# Patient Record
Sex: FEMALE
[De-identification: names, ages, dates, MRNs, and addresses within clinical notes are randomized per-mention and may not be internally consistent; named-entity substitution may affect disease eponyms.]

---

## 2019-11-12 NOTE — NUR
*-* CASE MANAGEMENT NOTE *-*



EMAIL SENT MY [facility] IN ADMITTING: MAKE SURE WE UTILIZE LETTER ATTAHCED IN EMAIL 
FOR DISCAHRGE PLANNING AND DME. EMAIL SENT ON 11/12/19

## 2019-11-15 ENCOUNTER — HOSPITAL ENCOUNTER (INPATIENT)
Dept: HOSPITAL 72 - SDSOVERFLO | Age: 49
LOS: 4 days | Discharge: HOME | DRG: 473 | End: 2019-11-19
Payer: COMMERCIAL

## 2019-11-15 VITALS — DIASTOLIC BLOOD PRESSURE: 55 MMHG | SYSTOLIC BLOOD PRESSURE: 119 MMHG

## 2019-11-15 VITALS — HEIGHT: 61 IN | WEIGHT: 170 LBS | BODY MASS INDEX: 32.1 KG/M2

## 2019-11-15 VITALS — SYSTOLIC BLOOD PRESSURE: 110 MMHG | DIASTOLIC BLOOD PRESSURE: 69 MMHG

## 2019-11-15 VITALS — DIASTOLIC BLOOD PRESSURE: 66 MMHG | SYSTOLIC BLOOD PRESSURE: 141 MMHG

## 2019-11-15 VITALS — SYSTOLIC BLOOD PRESSURE: 144 MMHG | DIASTOLIC BLOOD PRESSURE: 67 MMHG

## 2019-11-15 VITALS — DIASTOLIC BLOOD PRESSURE: 74 MMHG | SYSTOLIC BLOOD PRESSURE: 167 MMHG

## 2019-11-15 VITALS — SYSTOLIC BLOOD PRESSURE: 129 MMHG | DIASTOLIC BLOOD PRESSURE: 67 MMHG

## 2019-11-15 VITALS — DIASTOLIC BLOOD PRESSURE: 68 MMHG | SYSTOLIC BLOOD PRESSURE: 131 MMHG

## 2019-11-15 VITALS — DIASTOLIC BLOOD PRESSURE: 82 MMHG | SYSTOLIC BLOOD PRESSURE: 143 MMHG

## 2019-11-15 VITALS — SYSTOLIC BLOOD PRESSURE: 165 MMHG | DIASTOLIC BLOOD PRESSURE: 74 MMHG

## 2019-11-15 VITALS — SYSTOLIC BLOOD PRESSURE: 150 MMHG | DIASTOLIC BLOOD PRESSURE: 72 MMHG

## 2019-11-15 VITALS — SYSTOLIC BLOOD PRESSURE: 152 MMHG | DIASTOLIC BLOOD PRESSURE: 66 MMHG

## 2019-11-15 VITALS — SYSTOLIC BLOOD PRESSURE: 136 MMHG | DIASTOLIC BLOOD PRESSURE: 71 MMHG

## 2019-11-15 DIAGNOSIS — R51: ICD-10-CM

## 2019-11-15 DIAGNOSIS — M50.121: Primary | ICD-10-CM

## 2019-11-15 DIAGNOSIS — Z98.1: ICD-10-CM

## 2019-11-15 PROCEDURE — 72141 MRI NECK SPINE W/O DYE: CPT

## 2019-11-15 PROCEDURE — 76000 FLUOROSCOPY <1 HR PHYS/QHP: CPT

## 2019-11-15 PROCEDURE — 70551 MRI BRAIN STEM W/O DYE: CPT

## 2019-11-15 PROCEDURE — 36415 COLL VENOUS BLD VENIPUNCTURE: CPT

## 2019-11-15 PROCEDURE — 0RB30ZZ EXCISION OF CERVICAL VERTEBRAL DISC, OPEN APPROACH: ICD-10-PCS

## 2019-11-15 PROCEDURE — 86901 BLOOD TYPING SEROLOGIC RH(D): CPT

## 2019-11-15 PROCEDURE — 87081 CULTURE SCREEN ONLY: CPT

## 2019-11-15 PROCEDURE — 0RG20A0 FUSION OF 2 OR MORE CERVICAL VERTEBRAL JOINTS WITH INTERBODY FUSION DEVICE, ANTERIOR APPROACH, ANTERIOR COLUMN, OPEN APPROACH: ICD-10-PCS

## 2019-11-15 PROCEDURE — 72125 CT NECK SPINE W/O DYE: CPT

## 2019-11-15 PROCEDURE — 86900 BLOOD TYPING SEROLOGIC ABO: CPT

## 2019-11-15 PROCEDURE — 94150 VITAL CAPACITY TEST: CPT

## 2019-11-15 PROCEDURE — 86850 RBC ANTIBODY SCREEN: CPT

## 2019-11-15 PROCEDURE — 72040 X-RAY EXAM NECK SPINE 2-3 VW: CPT

## 2019-11-15 PROCEDURE — 94003 VENT MGMT INPAT SUBQ DAY: CPT

## 2019-11-15 PROCEDURE — 0RP30JZ REMOVAL OF SYNTHETIC SUBSTITUTE FROM CERVICAL VERTEBRAL DISC, OPEN APPROACH: ICD-10-PCS

## 2019-11-15 RX ADMIN — SODIUM CHLORIDE PRN MG: 9 INJECTION, SOLUTION INTRAVENOUS at 14:57

## 2019-11-15 RX ADMIN — SODIUM CHLORIDE AND POTASSIUM CHLORIDE SCH MLS/HR: 9; 1.49 INJECTION, SOLUTION INTRAVENOUS at 17:36

## 2019-11-15 RX ADMIN — SODIUM CHLORIDE PRN MG: 9 INJECTION, SOLUTION INTRAVENOUS at 15:25

## 2019-11-15 RX ADMIN — HYDROCODONE BITARTRATE AND ACETAMINOPHEN PRN TAB: 7.5; 325 TABLET ORAL at 22:04

## 2019-11-15 RX ADMIN — SODIUM CHLORIDE SCH MLS/HR: 0.9 INJECTION INTRAVENOUS at 19:55

## 2019-11-15 RX ADMIN — DOCUSATE SODIUM SCH MG: 100 CAPSULE, LIQUID FILLED ORAL at 17:36

## 2019-11-15 RX ADMIN — DOCUSATE SODIUM - SENNOSIDES SCH TAB: 50; 8.6 TABLET, FILM COATED ORAL at 17:36

## 2019-11-15 RX ADMIN — DEXAMETHASONE SODIUM PHOSPHATE SCH MG: 4 INJECTION, SOLUTION INTRA-ARTICULAR; INTRALESIONAL; INTRAMUSCULAR; INTRAVENOUS; SOFT TISSUE at 17:37

## 2019-11-15 NOTE — DIAGNOSTIC IMAGING REPORT
Indication: Intraoperative imaging

 

COMPARISON: None

 

FINDINGS:

 

Multiple  fluoroscopic images were obtained intraoperatively.

 

 film demonstrates anterior fusion hardware at C5-6. This was removed as noted

on subsequent images followed by images showing anterior fusion at C4-5 and C6-7.

 

IMPRESSION:

 

Intraoperative imaging as described above

## 2019-11-15 NOTE — ANETHESIA PREOPERATIVE EVAL
Anesthesia Pre-op PMH/ROS


General


Date of Evaluation:  Nov 15, 2019


Time of Evaluation:  11:12


Anesthesiologist:  Chad


ASA Score:  ASA 3


Mallampati Score


Class I : Soft palate, uvula, fauces, pillars visible


Class II: Soft palate, uvula, fauces visible


Class III: Soft palate, base of uvula visible


Class IV: Only hard plate visible


Mallampati Classification:  Class II


Surgeon:  Sharath


Diagnosis:  Neck Pain


Surgical Procedure:  ACDF C4-5, C6-7


Anesthesia History:  none


Family History:  no anesthesia problems


Allergies:  


Uncoded Allergies:  


     CLEAR TAPE (Allergy, Mild, RASH , 11/15/19)


Medications:  see eMAR


Patient NPO?:  Yes





Past Medical History


Pulmonary:  Reports: asthma


Neurologic/Psychiatric:  Reports: depression/anxiety, other - Migranes


Endocrine:  Reports: DM


Other:  obesity - BMI 32


PSxH Narrative:


Cholecystectomy, Shoulder Sx X3, C/S, Gastric Bypass





Anesthesia Pre-op Phys. Exam


Physician Exam





Last 24 Hour Vital Signs








  Date Time  Temp Pulse Resp B/P (MAP) Pulse Ox O2 Delivery O2 Flow Rate FiO2


 


11/15/19 10:33      Room Air  


 


11/15/19 10:06 97.2 61 18 119/55 (76) 100   








Constitutional:  NAD


Neurologic:  CN 2-12 intact


Cardiovascular:  RRR


Respiratory:  CTA


Gastrointestinal:  S/NT/ND





Airway Exam


Mallampati Score:  Class II


MO:  full


ROM:  limited


Teeth:  intact





Anesthesia Pre-op A/P


Risk Assessment & Plan


Assessment:


ASA 3


Plan:


GA, SED, GlideScope Go


Status Change Before Surgery:  No





Pre-Antibiotics


Dru Grams Ancef IV


Given Within 1 Hr of Incision:  Yes


Time Given:  11:26











Gucci Bonilla MD Nov 15, 2019 09:05

## 2019-11-15 NOTE — NUR
nurse's notes: received patient sleeping but easily arousable; admits to a 6/10 headache; 
denies any numbness or tingling on all extremities; surgical site noted with dermabond; no 
s/s of infection; educated patient on the need to void soon; patient verbalized 
understanding; plan of care discussed and will be continued this shift; will continue to 
monitor.

## 2019-11-15 NOTE — NUR
NURSE NOTES:

Pt received from PACU. Patient AOx4 with complaints of headache 9/10. Per PACU nurse she 
gave various medications for pain. Will reassess. RR even and unlabored on 2L NC. Surgical 
site intact with ice pack in place. Pt does not have insurance card with her therefore we 
could not fill her prescription. Pt will have to fill prescription at her own pharmacy. Will 
continue to monitor.

## 2019-11-15 NOTE — IMMEDIATE POST-OP EVALUATION
Immediate Post-Op Evalulation


Immediate Post-Op Evalulation


Procedure:  ACDF C4-5, C6-7


Date of Evaluation:  Nov 15, 2019


Time of Evaluation:  14:40


IV Fluids:  700 LR


Blood Products:  0


Estimated Blood Loss:  40


Urinary Output:  200


Blood Pressure Systolic:  174


Blood Pressure Diastolic:  59


Pulse Rate:  74


Respiratory Rate:  16


O2 Sat by Pulse Oximetry:  100


Temperature (Fahrenheit):  97


Pain Score (1-10):  2


Nausea:  No


Vomiting:  No


Complications


0


Patient Status:  awake, reacts, patent, extubated, none


Hydration Status:  adequate


Dru Grams Ancef IV


Given Within 1 Hr of Incision:  Yes


Time Given:  11:26











Gucci Bonilla MD Nov 15, 2019 08:09

## 2019-11-15 NOTE — OPERATIVE NOTE - DICTATED
DATE OF OPERATION:  11/15/2019

SURGEON:  Les Early M.D., Orthopaedic Spine Surgeon.



ASSISTANT:  Aaron Huizar M.D.



ANESTHESIOLOGIST:  Gucci Bonilla M.D.



PREOPERATIVE DIAGNOSES:  Retained hardware and fusion at C5-C6, herniation

at adjacent level at C4-C5 and C6-C7.



POSTOPERATIVE DIAGNOSES:  Retained hardware and fusion at C5-C6, herniation

at adjacent level at C4-C5 and C6-C7.



PROCEDURE PERFORMED:

1. Removal of prior anterior fusion plate at C5-C6.

2. Assessment of intraoperative fusion at C5-C6.

3. Anterior cervical discectomy and fusion of C4-C5 using NuVasive

Interlock-C size 6 PEEK cage and three screws of 13 mm length with 1 mL of

Osteocel allograft bone and local autograft.

4. Anterior cervical discectomy and fusion of C6-C7 using NuVasive

Interlock-C size 6 PEEK cage and three screws of 13 mm length with 1 mL of

Osteocel allograft bone and local autograft.

5. Use of intraoperative microscope.

6. Motor-evoked potential monitoring.

7. Somatosensory-evoked potential monitoring.

8. Supervision and interpretation of fluoroscopy.



COMPLICATIONS:  None.



ANESTHESIA:  General.



ESTIMATED BLOOD LOSS:  Less than 100 mL.



INDICATIONS FOR SURGERY:  This patient is a 49-year-old female who has a

history of retained hardware and fusion, C5-C6; herniation at adjacent

level at C4-C5 and C6-C7.



We tried a course of conservative management, but despite this course,

there was still a significant component of persistent, recalcitrant neck

pain and arm pain.  The MRI demonstrated significant neural foraminal

compromise secondary to disc herniations at C4-C5, C6-C7.



We had a long discussion with Kathryn regarding the risks and benefits

of surgery.  Our discussion included but was not limited to nonoperative

management, chiropractic management, another epidural steroid injection as

well as definitive management in the form of surgery.  We recommended a

removal of prior anterior fusion plate at C5-C6 as final definitive

management.  We reviewed the risks and benefits of surgery with the

patient.  Our discussion included a comprehensive review of the clinical

issues and the nature of the clinical decision.



We reviewed the alternatives, including doing nothing.  The patient

elected to proceed accordingly with removal of prior anterior fusion plate

at C5-C6.  We had a long discussion regarding the risks, alternatives, and

benefits of surgery.  Our description of the risks included a discussion

in person as well as a signed consent which detailed all pertinent risks

from the procedure itself.  Briefly, our discussion included but was not

limited to infection, bleeding, pseudarthrosis, spinal cord injury,

neurovascular injury, dural tear, CSF leak, neuropathy, paralysis,

permanent weakness/drop foot/drop arm, paresthesias, blindness, palsy, and

weakness.  The patient understood there may be a need for a revision

surgery or additional procedures.  Approach-related complications

including dysphonia, dysphagia, blindness, permanent vocal cord and neural

injury, hematoma, swallowing and breathing difficulty.  Medical

complications were reviewed including liver, kidney, shock,

cardiopulmonary failure, anesthesia complications including death,

swelling, damage to the musculature, larynx/voice injury or loss,

esophagus/throat, trachea, blood vessels and muscles/muscular sprain and

lungs/pneumothorax during this surgical procedure; injury to deeper

structures may be temporary or permanent.



After this review of risks, the patient understood these and elected to

proceed.  A written and verbal consent was given.  We discussed the pros

and cons of all the alternatives.  We discussed the uncertainties

associated with the decision.  Afterwards, I assessed the patient's

understanding and explored their preferences.  All questions were answered

and no guarantees were given.  Medical clearance was obtained prior to

surgery.



INTRAOPERATIVE FINDINGS:  C5-C6; once the plate was removed, I determined

that the fusion intraoperatively was solid upon intraoperative motion of

the interspace.



C4-C5; the disc itself was soft and mobile.  There was slight decrease

in disc height.  The disc had formed to be a calcification in it.  Upon

reflection of the disc, I noted a tear in the posterior longitudinal

ligament, which was near the intersection of the midline to the right side

of the neural foramina.  Through this, I noted a herniated fragment, which

was resected because it was applying significant pressure to the spinal

cord and thecal sac posteriorly.



C6-C7; at C6-C7, I noted a tear.  On the anterior longitudinal ligament,

there was a large anterior bony spur near the superior endplate of C6.

Once this was resected, we gained access to the disc space.  The disc

itself was completely collapsed.  The disc therein was calcified.  There

was severe dehydration of the disc once the disc was pride open and there

was a tear in the posterior longitudinal ligament, which was approximately

10 degrees cephalad to caudad, right-sided.  This was probed.  There was a

herniated fragment posterior to this causing severe neural foraminal

encroachment on the right side.



DESCRIPTION OF PROCEDURE:  Under the benefit of general endotracheal

anesthesia and with the assistance of the entire operative team, the

patient was moved from the rney onto the operative table in the supine

position.  The head was secured and carefully positioned appropriately.

Bilateral arms were secured with Gel Pads and foam and all bony

prominences were padded.  For the bilateral lower extremities, SCD and LEA

hose were placed for DVT prophylaxis.  A surgical timeout was called which

corroborated our planned procedure of removal of prior anterior fusion

plate at C5-C6.  Preoperative antibiotics were administered within 30

minutes of the incision for antibiotic prophylaxis.  Using lateral

fluoroscopic radiography, the operative levels were delineated.  Next, the

wound was prepped and draped with chlorhexidine and sterile drapes.  An

incision was based on lateral fluoroscopy and we centered our incision at

the C4-C5, C5-C6, C6-C7 interspace and next using a standard

De Jesus-Quintero anterior-based approach, the incision was taken down

through the skin and subcutaneous tissues until the vertebral bodies and

their corresponding disc spaces were visualized.  A needle was placed into

the interspace to confirm placement of the operative interspace and we

performed the remainder of procedure under microscopic visualization.



Next, using bipolar and Bovie cautery to ensure meticulous hemostasis,

the longus colli was mobilized bilaterally and retractors were placed deep

to the longus colli bilaterally to address retraction.



Next, we turned our attention to the radical anterior discectomy.  This

was initially performed at C4-C5 level first by using a 15 blade scalpel

followed by narrow pituitaries and a Microsect 5-B curette was used to

denude the endplate of all cartilaginous tissue.  Next, using a [employer]

AM8 drill bit, the partial vertebrectomy was performed in a cell-by-cell

and layer-by-layer fashion, and ultimately the posterior uncinate joints

bilaterally and posterior osteophytic lips and margins causing central and

lateral impingement were carefully denuded until visualization of the

posterior longitudinal ligament was possible.  An endplate preparation was

performed in the exact same fashion using an intervertebral ,

sequential distraction was obtained throughout the disc space.  We saw a

tear/rent in the PLL and this was carefully mobilized and dissected using

a Microsect 1-B curette until we visualized a broad-based disc herniation

with compression of the spinal cord as well as neural foramina, which was

right-sided.  This neural foraminal compression was carefully resected

using a Kerrison-1 and Kerrison-2 rongeurs until complete decompression of

the spinal cord was visualized and complete decompression of the neural

foramina and nerve root therein as well as the axilla and lateral margin

of the nerve root was visualized and subsequently completely decompressed.

The family was notified at one-hour intervals throughout the procedure to

provide for consistent updates.



Next, we turned our attention to the radical anterior discectomy at the

C6-C7 level first by using a 15 blade scalpel followed by narrow

pituitaries and a Microsect 5-B curette was used to denude the endplate of

all cartilaginous tissue.  Next, using a [employer] AM8 drill bit, the

partial vertebrectomy was performed in a cell-by-cell and layer-by-layer

fashion, and ultimately the posterior uncinate joints bilaterally and

posterior osteophytic lips and margins causing central and lateral

impingement were carefully denuded until visualization of the posterior

longitudinal ligament was possible.  An endplate preparation was performed

in the exact same fashion using an intervertebral , sequential

distraction was obtained throughout the disc space.  We saw a tear/rent in

the PLL and this was carefully mobilized and dissected using a Microsect

1-B curette until we visualized a broad-based disc herniation with

compression of the spinal cord as well as neural foramina which was

right-sided.  This neural foraminal compression was carefully resected

using a Kerrison-1 and Kerrison-2 rongeurs until complete decompression of

the spinal cord was visualized and complete decompression of the neural

foramina and nerve root therein as well as the axilla and lateral margin

of the nerve root was visualized and subsequently completely

decompressed.



We next turned our attention towards trialing our implant within the

disc space at C4-C5.  We initially tried size 5 and afterwards size 6

trial from the NuVasive system at each level, which appeared to be

appropriate under AP and lateral fluoroscopy as well as in terms of its

height, depth, width, and lack of toggle.  The PEEK (polyetheretherketone)

interbody cages were then both packed with 1 mL of allograft bone from

Osteocel and local autograft bone matrix.  Next, these were then carefully

advanced and secured into their intervertebral spaces under direct

visualization and with supervision of AP and lateral fluoroscopic views.



This was then performed at the next level, C6-C7.  We initially tried

size 5 and afterwards size 6 trial from the NuVasive system at each level,

which appeared to be appropriate under AP and lateral fluoroscopy as well

as in terms of its height, depth, width, and lack of toggle.  The PEEK

(polyetheretherketone) interbody cages were then both packed with 1 mL of

allograft bone from Osteocel and local autograft bone matrix.  Next, these

were then carefully advanced and secured into their intervertebral spaces

under direct visualization and with supervision of AP and lateral

fluoroscopic views.



We next turned our attention towards plating at C4-C5.  Plating was

performed with [employer] Interlock-C plating system.  A total of 3 screws,

size 13 mm in length were inserted and confirmed under AP and lateral

fluoroscopy and confirmed to be in excellent position.



This was then performed at the next level, C6-C7.  Plating was performed

with NuVasive Interlock-C plating system.  A total of 3 screws, size 13 mm

in length were inserted and confirmed under AP and lateral fluoroscopy and

confirmed to be in excellent position.



After a finger sweep, we confirmed removal of all sponges.  The

retractor was removed and we next turned our attention to meticulous

hemostasis with FloSeal and bipolar cautery.  After the sponge and needle

count was again found to be correct with our second count, we next turned

our attention to closure.  The wound was again copiously irrigated with

antibiotic-impregnated saline.



Closure consisted of 4-0 clear nylon for the platysma, and 6-0 clear

nylon for the superficial skin.



Final skin closure and dressings consisted of Dermabond.  Prior to final

closure, a final radiograph was obtained which demonstrated the hardware

was intact with excellent position throughout.



The patient tolerated the procedure well.  The patient was carefully

extubated after the conclusion of surgery.  We discussed the findings of

the surgery with the family upon completion of the case.  At this point,

the patient was transferred to the spine floor for further observation.









  ______________________________________________

  Les Early M.D. DR:  Kimi

D:  11/15/2019 19:07

T:  11/15/2019 22:38

JOB#:  [id number]

CC:



KENDRA

## 2019-11-15 NOTE — BRIEF OPERATIVE NOTE
Immediate Post Operative Note


Operative Note


Chief Complaint:  radicular pain and neck pain


Pre-op Diagnosis:


Herniation of Cervical 45,67 ,retained cervical 56


Procedure:


Hardware removal of Cervical 56, Anterior cervical discectomy and fusion of 

Cervical 45 and Cervical 67


Post-op Diagnosis:  same as pre-op


Findings:  consistent w/pre-op dx studies


Surgeon:  Sharath


Assistant:  Tonya


Anesthesiologist:  KIM


Anesthesia:  general


Specimen:  none


Complications:  none


Condition:  stable


Fluids:  IVF


Estimated Blood Loss:  minimal


Drains:  none


Implant(s) used?:  Yes - nuvasive interlock C sz 6x2, screws 13x6











Les Early MD Nov 15, 2019 10:58

## 2019-11-16 VITALS — SYSTOLIC BLOOD PRESSURE: 155 MMHG | DIASTOLIC BLOOD PRESSURE: 76 MMHG

## 2019-11-16 VITALS — SYSTOLIC BLOOD PRESSURE: 116 MMHG | DIASTOLIC BLOOD PRESSURE: 70 MMHG

## 2019-11-16 VITALS — SYSTOLIC BLOOD PRESSURE: 134 MMHG | DIASTOLIC BLOOD PRESSURE: 65 MMHG

## 2019-11-16 VITALS — DIASTOLIC BLOOD PRESSURE: 56 MMHG | SYSTOLIC BLOOD PRESSURE: 120 MMHG

## 2019-11-16 VITALS — DIASTOLIC BLOOD PRESSURE: 69 MMHG | SYSTOLIC BLOOD PRESSURE: 127 MMHG

## 2019-11-16 VITALS — DIASTOLIC BLOOD PRESSURE: 74 MMHG | SYSTOLIC BLOOD PRESSURE: 149 MMHG

## 2019-11-16 RX ADMIN — DOCUSATE SODIUM SCH MG: 100 CAPSULE, LIQUID FILLED ORAL at 08:29

## 2019-11-16 RX ADMIN — BUPROPION HYDROCHLORIDE SCH MG: 150 TABLET, FILM COATED, EXTENDED RELEASE ORAL at 11:08

## 2019-11-16 RX ADMIN — ACETAZOLAMIDE SCH MG: 500 INJECTION, POWDER, LYOPHILIZED, FOR SOLUTION INTRAVENOUS at 23:32

## 2019-11-16 RX ADMIN — SODIUM CHLORIDE SCH MLS/HR: 0.9 INJECTION INTRAVENOUS at 11:20

## 2019-11-16 RX ADMIN — DOCUSATE SODIUM - SENNOSIDES SCH TAB: 50; 8.6 TABLET, FILM COATED ORAL at 08:29

## 2019-11-16 RX ADMIN — SODIUM CHLORIDE AND POTASSIUM CHLORIDE SCH MLS/HR: 9; 1.49 INJECTION, SOLUTION INTRAVENOUS at 13:00

## 2019-11-16 RX ADMIN — ACETAZOLAMIDE SCH MG: 500 INJECTION, POWDER, LYOPHILIZED, FOR SOLUTION INTRAVENOUS at 17:49

## 2019-11-16 RX ADMIN — MORPHINE SULFATE PRN MG: 4 INJECTION INTRAVENOUS at 11:07

## 2019-11-16 RX ADMIN — SODIUM CHLORIDE SCH MLS/HR: 0.9 INJECTION INTRAVENOUS at 03:09

## 2019-11-16 RX ADMIN — DEXAMETHASONE SODIUM PHOSPHATE SCH MG: 4 INJECTION, SOLUTION INTRA-ARTICULAR; INTRALESIONAL; INTRAMUSCULAR; INTRAVENOUS; SOFT TISSUE at 00:06

## 2019-11-16 RX ADMIN — DOCUSATE SODIUM - SENNOSIDES SCH TAB: 50; 8.6 TABLET, FILM COATED ORAL at 17:22

## 2019-11-16 RX ADMIN — DEXAMETHASONE SODIUM PHOSPHATE SCH MG: 4 INJECTION, SOLUTION INTRA-ARTICULAR; INTRALESIONAL; INTRAMUSCULAR; INTRAVENOUS; SOFT TISSUE at 12:20

## 2019-11-16 RX ADMIN — SODIUM CHLORIDE AND POTASSIUM CHLORIDE SCH MLS/HR: 9; 1.49 INJECTION, SOLUTION INTRAVENOUS at 20:04

## 2019-11-16 RX ADMIN — HYDROCODONE BITARTRATE AND ACETAMINOPHEN PRN TAB: 7.5; 325 TABLET ORAL at 22:36

## 2019-11-16 RX ADMIN — DEXAMETHASONE SODIUM PHOSPHATE SCH MG: 4 INJECTION, SOLUTION INTRA-ARTICULAR; INTRALESIONAL; INTRAMUSCULAR; INTRAVENOUS; SOFT TISSUE at 05:15

## 2019-11-16 RX ADMIN — DOCUSATE SODIUM SCH MG: 100 CAPSULE, LIQUID FILLED ORAL at 17:22

## 2019-11-16 RX ADMIN — HYDROCODONE BITARTRATE AND ACETAMINOPHEN PRN TAB: 7.5; 325 TABLET ORAL at 07:33

## 2019-11-16 RX ADMIN — SODIUM CHLORIDE AND POTASSIUM CHLORIDE SCH MLS/HR: 9; 1.49 INJECTION, SOLUTION INTRAVENOUS at 03:09

## 2019-11-16 NOTE — NUR
nurse's notes: no incidents of falls, injuries or trauma reported as of this time; pain 
managed well with ordered medication with good results; surgical incision site continues to 
be clean and dry; no s/s of infection; denies any numbness or tingling on all extremities; 
up from bed  with minimal assist; voiding freely without any problem; however, no BM this 
shift; will continue plan of care until all set goals are met by day of discharge.

## 2019-11-16 NOTE — NUR
NURSE NOTES:



AWAKE/ALERT. PAIN SCALE 8/10. GIVEN NORCO 7.5/325 2 TABS PO FOR NECK AND HEADACHE. DERMABAND 
DRESSING DRY AND INTACT. WITH MILD TINGLING BOTH FINFERS. IN NO DISTRESS.

## 2019-11-16 NOTE — NUR
NURSE NOTES:



DR CORDOVA CALLED RE: [facility] HOME [facility]. PT C/O SEVERE MIGRAINE HEADACHE AND 
PUFFINESS OF BOTH EYES. LEFT MESSAGE TO RETURN CALL.

## 2019-11-16 NOTE — GENERAL SURGERY PROGRESS NOTE
General Surgery-Progress Note


Subjective


Day of Surgery:  11/15/19


Procedure Performed


Hardware removal of Cervical 56, Anterior cervical discectomy and fusion of 

Cervical 45 and Cervical 67


Symptoms:  improved, tolerating diet, voiding well, other - notes headaches, 

associated with elevated blood pressures, headaches while standing. Unable to 

do PT today





Objective





Last 24 Hour Vital Signs








  Date Time  Temp Pulse Resp B/P (MAP) Pulse Ox O2 Delivery O2 Flow Rate FiO2


 


11/16/19 13:00  79  149/74 (99)    


 


11/16/19 12:00 97.7 68 20 155/76 (102) 94   


 


11/16/19 11:37 98.4       


 


11/16/19 09:12      Room Air  


 


11/16/19 08:03 98.4       


 


11/16/19 08:00 97.8 78 20 116/70 (85) 97   





  72      


 


11/15/19 21:00      Room Air 3.0 


 


11/15/19 20:00 98.4 72 20 110/69 (83) 97   


 


11/15/19 17:00 98.1 79 19 131/68 (89) 98   


 


11/15/19 16:00      Nasal Cannula 2.0 


 


11/15/19 16:00 98.1 72 19 143/82 (102) 100   


 


11/15/19 15:57 97.9       


 


11/15/19 15:55 97.9       


 


11/15/19 15:37 97.9 82 15 136/71 98 Nasal Cannula 3 


 


11/15/19 15:22  79 19 129/67 96 Nasal Cannula 3 


 


11/15/19 15:08 97.8       


 


11/15/19 15:07  85 15 144/67 99 Nasal Cannula 3 


 


11/15/19 14:57  81 15 152/66 100 Nasal Cannula 3 


 


11/15/19 14:49  83 14 141/66 100 Simple Mask 6 


 


11/15/19 14:39  77 15 150/72 99 Simple Mask 6 


 


11/15/19 14:34  73 18 165/74 100 Simple Mask 6 


 


11/15/19 14:29 97.0 73 17 167/74 100 Simple Mask 6 


 


11/15/19 14:29  74 16  100   








I&O











Intake and Output  


 


 11/15/19 11/16/19





 18:59 06:59


 


Intake Total 1100 ml 1710 ml


 


Output Total 240 ml 


 


Balance 860 ml 1710 ml


 


  


 


Intake Oral  400 ml


 


IV Total 1100 ml 1310 ml


 


Output Urine Total 200 ml 


 


Estimated Blood Loss 40 ml 


 


# Voids 1 2








Dressing:  dry


Wound:  clean, dry, intact


Drains:  none


Abdomen:  non-tender


Extremities:  no edema, no tenderness, no cyanosis





Plan


Additional Comments


Rechecked bp whilst in the room...still elevated....Plan for Acetazolamide


Instructed her to work on maximizing fluid intake


May be orthostatic hypot


Plan to get up slowly with PT (first hob up, then sit up 90deg, then stand with 

assistance)











Les Early MD Nov 16, 2019 13:36

## 2019-11-16 NOTE — NUR
NURSE NOTES:



bp 155/76,p68 . c/o headache. bp again rechecked 149/74,p 79. dr mendez called left message 
to return call.

## 2019-11-16 NOTE — NUR
NURSE NOTE:



Pt is A/Ox4 with stable VS. Orders reviewed and physical assessment completed. Call bell is 
within reach. Will continue to monitor.

## 2019-11-16 NOTE — NUR
PT Note

PT rubina completed, tx initiated. Patient c/o HA and dizziness during sitting/standing 
activities. Patient's BP in supine is 152/80, HR 72; sitting BP is 146/75, HR 68; standing 
/76, HR 68; after ambulating ~60 ft, /76, HR 81. Patient has a very unsteady 
gait and is a high risk for falls. RN/CN notified.  Patient needs PT to increase her muscle 
strength and balance and to instruct/train on proper body mechanics to improve her safety in 
mobility and gait to enable her to return home. 

-------------------------------------------------------------------------------

Addendum: 11/16/19 at 1038 by MIKAEL FERNANDEZ PT

-------------------------------------------------------------------------------

Amended: Links added.

## 2019-11-17 VITALS — SYSTOLIC BLOOD PRESSURE: 126 MMHG | DIASTOLIC BLOOD PRESSURE: 63 MMHG

## 2019-11-17 VITALS — SYSTOLIC BLOOD PRESSURE: 113 MMHG | DIASTOLIC BLOOD PRESSURE: 54 MMHG

## 2019-11-17 VITALS — DIASTOLIC BLOOD PRESSURE: 48 MMHG | SYSTOLIC BLOOD PRESSURE: 91 MMHG

## 2019-11-17 VITALS — SYSTOLIC BLOOD PRESSURE: 125 MMHG | DIASTOLIC BLOOD PRESSURE: 61 MMHG

## 2019-11-17 VITALS — SYSTOLIC BLOOD PRESSURE: 101 MMHG | DIASTOLIC BLOOD PRESSURE: 50 MMHG

## 2019-11-17 RX ADMIN — ACETAZOLAMIDE SCH MG: 500 INJECTION, POWDER, LYOPHILIZED, FOR SOLUTION INTRAVENOUS at 23:27

## 2019-11-17 RX ADMIN — DOCUSATE SODIUM SCH MG: 100 CAPSULE, LIQUID FILLED ORAL at 08:34

## 2019-11-17 RX ADMIN — SODIUM CHLORIDE AND POTASSIUM CHLORIDE SCH MLS/HR: 9; 1.49 INJECTION, SOLUTION INTRAVENOUS at 19:00

## 2019-11-17 RX ADMIN — ACETAZOLAMIDE SCH MG: 500 INJECTION, POWDER, LYOPHILIZED, FOR SOLUTION INTRAVENOUS at 12:16

## 2019-11-17 RX ADMIN — SUMATRIPTAN SUCCINATE PRN MG: 50 TABLET, FILM COATED ORAL at 08:34

## 2019-11-17 RX ADMIN — BUPROPION HYDROCHLORIDE SCH MG: 150 TABLET, FILM COATED, EXTENDED RELEASE ORAL at 08:34

## 2019-11-17 RX ADMIN — DOCUSATE SODIUM - SENNOSIDES SCH TAB: 50; 8.6 TABLET, FILM COATED ORAL at 08:33

## 2019-11-17 RX ADMIN — MORPHINE SULFATE PRN MG: 4 INJECTION INTRAVENOUS at 09:13

## 2019-11-17 RX ADMIN — DOCUSATE SODIUM SCH MG: 100 CAPSULE, LIQUID FILLED ORAL at 17:26

## 2019-11-17 RX ADMIN — ACETAZOLAMIDE SCH MG: 500 INJECTION, POWDER, LYOPHILIZED, FOR SOLUTION INTRAVENOUS at 17:26

## 2019-11-17 RX ADMIN — SODIUM CHLORIDE AND POTASSIUM CHLORIDE SCH MLS/HR: 9; 1.49 INJECTION, SOLUTION INTRAVENOUS at 08:37

## 2019-11-17 RX ADMIN — DOCUSATE SODIUM - SENNOSIDES SCH TAB: 50; 8.6 TABLET, FILM COATED ORAL at 17:26

## 2019-11-17 RX ADMIN — ACETAZOLAMIDE SCH MG: 500 INJECTION, POWDER, LYOPHILIZED, FOR SOLUTION INTRAVENOUS at 05:07

## 2019-11-17 NOTE — GENERAL PROGRESS NOTE
Assessment/Plan


Problem List:  


(1) Migraine


ICD Codes:  G43.909 - Migraine, unspecified, not intractable, without status 

migrainosus


SNOMED:  48136160


(2) HNP (herniated nucleus pulposus), cervical


ICD Codes:  M50.20 - Other cervical disc displacement, unspecified cervical 

region


SNOMED:  40558378, [id number]


Status:  stable


Assessment/Plan:


MRI per surgery


imitrex


pain rx





Subjective


ROS Limited/Unobtainable:  No


Constitutional:  Reports: no symptoms


HEENT:  Reports: other - headache


Cardiovascular:  Reports: no symptoms


Respiratory:  Reports: no symptoms


Gastrointestinal/Abdominal:  Reports: no symptoms


Genitourinary:  Reports: no symptoms


Neurologic/Psychiatric:  Reports: headache


Endocrine:  Reports: no symptoms


Hematologic/Lymphatic:  Reports: no symptoms


Allergies:  


Uncoded Allergies:  


     CLEAR TAPE (Allergy, Mild, RASH , 11/15/19)


All Systems:  reviewed and negative except above


Subjective


s/p acdf. +headache. +migraines. no dysphagia. no cp/sob





Objective





Last 24 Hour Vital Signs








  Date Time  Temp Pulse Resp B/P (MAP) Pulse Ox O2 Delivery O2 Flow Rate FiO2


 


11/17/19 04:36 98.1 74 18 126/63 (84) 97   


 


11/16/19 23:47 98.4 81 18 127/69 (88) 97   


 


11/16/19 21:00      Room Air  


 


11/16/19 20:50 98.7 80 18 120/56 (77) 95   


 


11/16/19 16:00 99.9 76 20 134/65 (88) 96   


 


11/16/19 13:00  79  149/74 (99)    


 


11/16/19 12:00 97.7 68 20 155/76 (102) 94   


 


11/16/19 11:37 98.4       


 


11/16/19 09:12      Room Air  

















Intake and Output  


 


 11/16/19 11/17/19





 19:00 07:00


 


Intake Total 2155 ml 800 ml


 


Balance 2155 ml 800 ml


 


  


 


Intake Oral 900 ml 


 


IV Total 1255 ml 800 ml


 


# Voids 2 








Height (Feet):  5


Height (Inches):  1.00


Weight (Pounds):  170


General Appearance:  WD/WN, alert


Neck:  supple


Cardiovascular:  normal rate, regular rhythm


Respiratory/Chest:  chest wall non-tender, lungs clear, normal breath sounds


Abdomen:  normal bowel sounds, non tender, soft


Edema:  no edema noted Arm (L), no edema noted Arm (R), no edema noted Leg (L), 

no edema noted Leg (R), no edema noted Pedal (L), no edema noted Pedal (R), no 

edema noted Generalized


Neurologic:  CNs II-XII grossly normal, alert, oriented x 3, responsive











Ash Orozco MD Nov 17, 2019 08:13

## 2019-11-17 NOTE — NUR
NURSE NOTE:



Pt is A/Ox4 with stable VS. Orders reviewed and physical assessment completed. Dermabond on 
anterior neck is clean, dry, and intact. Pt still complains of an ongoing headache. Call 
bell is within reach, will continue to monitor.

## 2019-11-17 NOTE — NUR
PT Note

Patient is scheduled to have an MRI due to persistent HA. Will hold PT this AM and check 
this PM if MRI results are negative.

## 2019-11-17 NOTE — NUR
NURSE NOTES:





DOZING. PAIN SCALE 8/10. NECK AND HEAD. ANT. NECK DERMABAND DRESSING DRY AND INTACT. IN NO 
ACUTE DISTRESS.

## 2019-11-17 NOTE — DIAGNOSTIC IMAGING REPORT
EXAM:

  MR Cervical Spine Without Intravenous Contrast

 

CLINICAL HISTORY:

  H A

 

TECHNIQUE:

  Magnetic resonance images of the cervical spine without intravenous 

contrast in multiple planes.

 

COMPARISON:

  Intraoperative cervical spine radiographs on 11 15 2019

 

FINDINGS:

  Vertebrae: Anterior fusion changes at C4-5 and C6-7.  No acute fracture.

 

  Spinal cord:  Unremarkable.  Normal signal.

  Soft tissues: Prevertebral soft tissue edema fluid is likely related to 

recent surgery.

 

 DISCS SPINAL CANAL NEURAL FORAMINA:

  C2-C3:  Unremarkable.  No significant disc disease.  No stenosis.

  C3-C4: Mild uncovertebral degenerative changes.  Borderline spinal 

canal stenosis.  No significant neural foraminal stenosis.

  C4-C5: Uncovertebral degenerative changes.  Mild spinal canal stenosis. 

 No significant neural foraminal stenosis.

  C5-C6: Uncovertebral degenerative changes.  Borderline spinal canal 

stenosis.  No significant neural foraminal stenosis.

  C6-C7: Uncovertebral degenerative changes.  Mild spinal canal stenosis. 

 Mild left neural foraminal stenosis.

  C7-T1:  Unremarkable.  No significant disc disease.  No stenosis.

 

IMPRESSION:     

Anterior fusion hardware at C4-5 and C6-7.  Prevertebral soft tissue 

edema fluid is likely related to recent surgery.

 

Borderline mild spinal canal stenoses.

## 2019-11-17 NOTE — NUR
NURSE NOTES:



c/o severe headache. given morphine 4mg iv as ordered. ic pack to forehead.hob placed on 
flat.

will continue to monitor.

## 2019-11-17 NOTE — DIAGNOSTIC IMAGING REPORT
EXAM:

  MR Head Without Intravenous Contrast

 

CLINICAL HISTORY:

  H A

 

TECHNIQUE:

  Magnetic resonance images of the head brain without intravenous 

contrast in multiple planes.

 

COMPARISON:

  None

 

FINDINGS:

  Brain:  No restricted diffusion to suggest acute infarct.  Mild chronic 

small vessel ischemic disease.  No hemorrhage.

  Ventricles:  Unremarkable.  No ventriculomegaly.

  Bones joints:  Unremarkable.

  Sinuses:  Mild mucosal thickening in the ethmoid air cells.  No acute 

sinusitis.

  Mastoid air cells:  Unremarkable as visualized.  No mastoid effusion.

  Orbits:  Unremarkable as visualized.

 

IMPRESSION:     

  No acute findings in the head brain.

## 2019-11-18 VITALS — SYSTOLIC BLOOD PRESSURE: 121 MMHG | DIASTOLIC BLOOD PRESSURE: 81 MMHG

## 2019-11-18 VITALS — SYSTOLIC BLOOD PRESSURE: 138 MMHG | DIASTOLIC BLOOD PRESSURE: 65 MMHG

## 2019-11-18 VITALS — SYSTOLIC BLOOD PRESSURE: 146 MMHG | DIASTOLIC BLOOD PRESSURE: 80 MMHG

## 2019-11-18 VITALS — DIASTOLIC BLOOD PRESSURE: 58 MMHG | SYSTOLIC BLOOD PRESSURE: 101 MMHG

## 2019-11-18 VITALS — DIASTOLIC BLOOD PRESSURE: 80 MMHG | SYSTOLIC BLOOD PRESSURE: 146 MMHG

## 2019-11-18 VITALS — DIASTOLIC BLOOD PRESSURE: 57 MMHG | SYSTOLIC BLOOD PRESSURE: 119 MMHG

## 2019-11-18 VITALS — DIASTOLIC BLOOD PRESSURE: 62 MMHG | SYSTOLIC BLOOD PRESSURE: 103 MMHG

## 2019-11-18 RX ADMIN — SODIUM CHLORIDE AND POTASSIUM CHLORIDE SCH MLS/HR: 9; 1.49 INJECTION, SOLUTION INTRAVENOUS at 05:12

## 2019-11-18 RX ADMIN — ACETAZOLAMIDE SCH MG: 500 INJECTION, POWDER, LYOPHILIZED, FOR SOLUTION INTRAVENOUS at 12:00

## 2019-11-18 RX ADMIN — BUPROPION HYDROCHLORIDE SCH MG: 150 TABLET, FILM COATED, EXTENDED RELEASE ORAL at 08:22

## 2019-11-18 RX ADMIN — ACETAZOLAMIDE SCH MG: 500 INJECTION, POWDER, LYOPHILIZED, FOR SOLUTION INTRAVENOUS at 17:51

## 2019-11-18 RX ADMIN — DOCUSATE SODIUM SCH MG: 100 CAPSULE, LIQUID FILLED ORAL at 17:09

## 2019-11-18 RX ADMIN — HYDROCODONE BITARTRATE AND ACETAMINOPHEN PRN TAB: 7.5; 325 TABLET ORAL at 08:24

## 2019-11-18 RX ADMIN — DOCUSATE SODIUM SCH MG: 100 CAPSULE, LIQUID FILLED ORAL at 08:22

## 2019-11-18 RX ADMIN — DOCUSATE SODIUM - SENNOSIDES SCH TAB: 50; 8.6 TABLET, FILM COATED ORAL at 08:22

## 2019-11-18 RX ADMIN — ACETAZOLAMIDE SCH MG: 500 INJECTION, POWDER, LYOPHILIZED, FOR SOLUTION INTRAVENOUS at 05:12

## 2019-11-18 RX ADMIN — SUMATRIPTAN SUCCINATE PRN MG: 50 TABLET, FILM COATED ORAL at 05:18

## 2019-11-18 RX ADMIN — MORPHINE SULFATE PRN MG: 4 INJECTION INTRAVENOUS at 14:06

## 2019-11-18 RX ADMIN — DOCUSATE SODIUM - SENNOSIDES SCH TAB: 50; 8.6 TABLET, FILM COATED ORAL at 17:09

## 2019-11-18 NOTE — NUR
NURSE NOTES:

Pt informed that writer was still awaiting arrival for Diamox from pharmacy. Pt asked what 
the medication was for. Educated on mechanism of medication, Pt " I am okay I do not need 
that , the other medicine you put in my vein worked" Referring to Morphine IV. Writer 
retrieved fiorset from [email or web address] as well with refusal. : " I do not think it hurts enough for 
that" " I will wait" Denies having a BM, but has gotten up frequently to urinate. Call light 
is in reach.

## 2019-11-18 NOTE — NUR
NURSE NOTES:

Dr Bonilla came to see pt, IV caffeine order pending. Order is not verified . Dr Nails phoned 
to inform him that pt has headache 5/10 Fioret given .Diamox is due at 12. pain 5/10

## 2019-11-18 NOTE — GENERAL PROGRESS NOTE
Assessment/Plan


Status:  stable


Assessment/Plan:


1. Removal of prior anterior fusion plate at C5-C6.


2. Assessment of intraoperative fusion at C5-C6.


3. Anterior cervical discectomy and fusion of C4-C5 using NuVasive


Interlock-C size 6 PEEK cage and three screws of 13 mm length with 1 mL of


Osteocel allograft bone and local autograft.


4. Anterior cervical discectomy and fusion of C6-C7 using NuVasive


5. postop headaches





PLAN


1. incentive spirometry


2. monitor headaches


3. PT evaluation and therapy


4. Hydration


5. Pain management


6. discharge once stable with outpatient follow up





Subjective


Allergies:  


Uncoded Allergies:  


     CLEAR TAPE (Allergy, Mild, RASH , 11/15/19)


Subjective


care noted


has headaches





Objective





Last 24 Hour Vital Signs








  Date Time  Temp Pulse Resp B/P (MAP) Pulse Ox O2 Delivery O2 Flow Rate FiO2


 


11/18/19 12:00 98.1 78 18 103/62 (76)    


 


11/18/19 10:20 98.7 76 18 146/80 (102) 72   





  76      


 


11/18/19 09:00      Room Air  


 


11/18/19 08:00 98.7 76 18 146/80 (102)    


 


11/18/19 04:21 99.0 79 17 138/65 (89) 98   


 


11/18/19 00:00 98.9 86 18 119/57 (77) 96   


 


11/17/19 21:00      Room Air  


 


11/17/19 20:00 99.1 91 17 113/54 (73) 96   


 


11/17/19 18:03 97.9       


 


11/17/19 16:00 97.9 78 20 91/48 (62) 95   

















Intake and Output  


 


 11/17/19 11/18/19





 19:00 07:00


 


Intake Total 1100 ml 1000 ml


 


Balance 1100 ml 1000 ml


 


  


 


Intake Oral 200 ml 


 


IV Total 900 ml 1000 ml


 


# Voids 1 








Height (Feet):  5


Height (Inches):  1.00


Weight (Pounds):  170


Objective


WDWN


NAD


clear breath sounds bilaterally without rhonchi or wheeze


B1A1OCL without MRG


NABS nontender no HSM


no CCE


nonfocal











Dawit Reilly MD Nov 18, 2019 13:05

## 2019-11-18 NOTE — NUR
NURSE NOTES:

Pt care taken over. Has verbalizations of pain i8/10. Norco 2 tabs given. Pt states that she 
has been having headaches that are painful , but  is not experiencing one at this time. Call 
light is in reach. Denies increase pressure during , change in position, denies fluid 
leaking from ears. Current plan will be followed

## 2019-11-18 NOTE — NUR
NURSES NOTE:



Received pt in bed, A/O X4, pt is able to express needs clearly. VS stable. Breathing 
pattern is even and unlabored on RA. Pt states she has a headache 3/10. All due medications 
will be given and encouraged to be taken by the patient due to earlier refusals. R hand 22 
gauge IV is patent, with no s/s of infection. Bed at lowest level.  Call light within reach. 
Pt will continue to be monitored.

## 2019-11-18 NOTE — NUR
HAND-OFF: 

Report given to Chalino Pierce, informed of caffeine, order that is iv is unverified. Pt refused 
Fioster in the evening. Encouraged to take dose . Refused Diamox R/B explained Oncoming 
nurse made ware that writer spoke to Jose in regards to delivery, of Diamox. Writer 
explained purpose of medication with refusal/Monitor for headache s/p anterior cerval 
surgery.

## 2019-11-18 NOTE — NUR
CASE MANAGEMENT:REVIEW



11/15/19

49 YR OLD FEMALE HERE FOR ELECTIVE SURGERY



SI: NECK PAIN AND RADICULOPATHY

97.2   61  18  119/55  100% ON RA



IS: TO SURGERY: HARDWARE REMOVAL AND

ANTERIOR CERVICAL DISCECTOMY

**: TO MED/SURG [facility] POST OP





11/16/19

SI: POD #1

97.8   78  20  116/70   97% ON RA



IS: IV ANCEF Q8HRS

IV DECADRON Q6HRS

IV DILAUDID PRN

**: MED/SURG [facility]





11/17/19

SI: POD #2

C/O HEADACHE

98.1   68  20  101/50   95% ON RA



IS: FIORICET PO TID PRN

IMITREX PO QD PRN MIGRAINES

IV DIAMOX Q6HRS

LEXAPRO PO QD

WELLBUTRIN PO QD

NORCO PO Q3HRS PRN

IV MORPHINE Q3HRS PRN

**: MED/SURG [facility]







11/18/19

SI: POD #3

C/O HEADACHE

98.1   78  18  103/62  98% ON RA



IS: FIORICET PO TID PRN

IMITREX PO QD PRN MIGRAINES

IV DIAMOX Q6HRS

LEXAPRO PO QD

WELLBUTRIN PO QD

NORCO PO Q3HRS PRN

IV MORPHINE Q3HRS PRN

**: MED/SURG [facility]

## 2019-11-18 NOTE — NUR
NURSE NOTES:

Upon this writing Dr Jha has not called. Pt continues to complain about headache to the 
posterior part of head. States that pain is lessened but still present.

## 2019-11-18 NOTE — NUR
NURSES NOTE:



Confirmed with pharmacy that 0000 dose of Diamox will be brought up in time for 0000 dose. 
Pharmacy confirmed dose would be available. Will administer medication according to eMAR.

## 2019-11-19 VITALS — DIASTOLIC BLOOD PRESSURE: 53 MMHG | SYSTOLIC BLOOD PRESSURE: 98 MMHG

## 2019-11-19 VITALS — SYSTOLIC BLOOD PRESSURE: 101 MMHG | DIASTOLIC BLOOD PRESSURE: 53 MMHG

## 2019-11-19 VITALS — SYSTOLIC BLOOD PRESSURE: 109 MMHG | DIASTOLIC BLOOD PRESSURE: 57 MMHG

## 2019-11-19 VITALS — DIASTOLIC BLOOD PRESSURE: 54 MMHG | SYSTOLIC BLOOD PRESSURE: 99 MMHG

## 2019-11-19 VITALS — SYSTOLIC BLOOD PRESSURE: 106 MMHG | DIASTOLIC BLOOD PRESSURE: 62 MMHG

## 2019-11-19 RX ADMIN — ACETAZOLAMIDE SCH MG: 500 INJECTION, POWDER, LYOPHILIZED, FOR SOLUTION INTRAVENOUS at 05:46

## 2019-11-19 RX ADMIN — DOCUSATE SODIUM SCH MG: 100 CAPSULE, LIQUID FILLED ORAL at 17:15

## 2019-11-19 RX ADMIN — BUPROPION HYDROCHLORIDE SCH MG: 150 TABLET, FILM COATED, EXTENDED RELEASE ORAL at 08:38

## 2019-11-19 RX ADMIN — ACETAZOLAMIDE SCH MG: 500 INJECTION, POWDER, LYOPHILIZED, FOR SOLUTION INTRAVENOUS at 11:29

## 2019-11-19 RX ADMIN — ACETAZOLAMIDE SCH MG: 500 INJECTION, POWDER, LYOPHILIZED, FOR SOLUTION INTRAVENOUS at 00:14

## 2019-11-19 RX ADMIN — DOCUSATE SODIUM - SENNOSIDES SCH TAB: 50; 8.6 TABLET, FILM COATED ORAL at 17:16

## 2019-11-19 RX ADMIN — DOCUSATE SODIUM - SENNOSIDES SCH TAB: 50; 8.6 TABLET, FILM COATED ORAL at 08:38

## 2019-11-19 RX ADMIN — DOCUSATE SODIUM SCH MG: 100 CAPSULE, LIQUID FILLED ORAL at 08:39

## 2019-11-19 NOTE — DIAGNOSTIC IMAGING REPORT
Indication: Pain, status post cervical hardware removal at C5-6, discectomy and

fusion of C4-5 and C6-7

 

Technique: Spiral acquisitions obtained through the cervical spine. No IV contrast

utilized. Multiplanar reconstructions were generated.  Total dose length product 319

mGycm. CTDIvol(s) 10 mGy. Dose reduction achieved using automated exposure control.

 

 

Comparison: none

 

Findings: Anterior fusion hardware is seen at C4-5, with anterior screws and a disc

spacer. This appears well aligned and there is no evidence of encroachment on the

spinal canal. Bony alignment at C4-5 appears unremarkable. No significant disc bulge

or protrusion, spinal stenosis, or neural foraminal narrowing demonstrated.

 

Anterior fusion hardware is seen at C6-7, with anterior screws and a disc spacer.

Hardware appears well aligned and does not encroach into the spinal canal. There is

very slight posterior offset of C6 on C7. There is borderline spinal stenosis at

C6-7, with minimum AP dimension of the spinal canal 9 mm. There is mild right and

mild-to-moderate left neural foraminal stenosis at this level, due predominantly to

uncinate hypertrophy.

 

At C5-6, there is complete ankylosis of the disc. Screw holes from prior hardware,

recently removed, are visualized. The bony alignment at this level is unremarkable.

There is borderline narrowing of the spinal canal at this level, minimum AP dimension

9 mm, due to predominantly due to short pedicles. The neural foramina are preserved.

 

There is mild degenerative disc narrowing at C3-4. There is borderline narrowing of

the spinal canal at this level due to short pedicles. The neural foramina are

preserved.

 

The remaining bony alignment is normal. The remaining disc spaces and neural foramina

are preserved. No acute fractures. No dislocations. No significant prevertebral soft

tissue swelling demonstrated.

 

A densely calcified nodule measuring 12 mm is seen in the lower pole of the right

thyroid lobe. What are presumably arianna calcifications are seen adjacent to the

thyroid on the left.

 

Impression: Postsurgical changes of the cervical spine, as described. No unusual

features

 

Mild degenerative changes, as detailed above

 

Densely calcified right lower pole thyroid nodule incidentally noted

 

 

 

The CT scanner at [facility] is accredited by the American College of

Radiology and the scans are performed using protocols designed to limit radiation

exposure to as low as reasonably achievable to attain images of sufficient resolution

adequate for diagnostic evaluation.

## 2019-11-19 NOTE — NUR
CASE MANAGEMENT:REVIEW







11/19/19

SI: POD #4

C/O PERSISTENT HEADACHE

98.8   74  20  109/57  100% ON RA





IS: FIORICET PO TID PRN

IMITREX PO QD PRN MIGRAINES

IV DIAMOX Q6HRS

LEXAPRO PO QD

WELLBUTRIN PO QD

NORCO PO Q3HRS PRN

IV MORPHINE Q3HRS PRN

NORCO PO Q3HRS PRN

**: [facility]

DCP: PATIENT IS FROM HOME





PLAN:

C/O SEVERE HEADACHES...11/17/19 ~ MRI CERVICAL SPINE AND MRI BRAIN

                                    11/19/19 ~ CT CERVICAL SPINE ~ IF NEGATIVE DISCHARGE 
HOME

## 2019-11-19 NOTE — NUR
NURSE NOTES:



discharged home per wheelchair accpd by partner in stable condition. dc instructions and rx 
given.

## 2019-11-19 NOTE — GENERAL PROGRESS NOTE
Assessment/Plan


Status:  stable, progressing


Assessment/Plan:


1. Removal of prior anterior fusion plate at C5-C6.


2. Assessment of intraoperative fusion at C5-C6.


3. Anterior cervical discectomy and fusion of C4-C5 using NuVasive


Interlock-C size 6 PEEK cage and three screws of 13 mm length with 1 mL of


Osteocel allograft bone and local autograft.


4. Anterior cervical discectomy and fusion of C6-C7 using NuVasive


5. postop headaches





PLAN


1. incentive spirometry


2. monitor for change


3. PT evaluation and therapy; out of bed as able


4. Hydration


5. Pain management


6. discharge once stable with outpatient follow up





Subjective


Allergies:  


Uncoded Allergies:  


     CLEAR TAPE (Allergy, Mild, RASH , 11/15/19)


Subjective


care noted


overnight events noted





Objective





Last 24 Hour Vital Signs








  Date Time  Temp Pulse Resp B/P (MAP) Pulse Ox O2 Delivery O2 Flow Rate FiO2


 


11/19/19 08:19      Room Air  


 


11/19/19 04:16 98.3 73 16 101/53 (69) 97   


 


11/19/19 00:00 98.1 17 17 106/62 (77) 97   





  73      


 


11/18/19 21:00      Room Air  


 


11/18/19 20:00 98.2 16 16 101/58 (72)    


 


11/18/19 16:00 98.2 16 18 121/81 (94)    


 


11/18/19 12:00 98.1 78 18 103/62 (76)    


 


11/18/19 10:20 98.7 76 18 146/80 (102) 72   





  76      


 


11/18/19 09:00      Room Air  








Height (Feet):  5


Height (Inches):  1.00


Weight (Pounds):  170


Objective


WDWN


NAD


clear breath sounds bilaterally without rhonchi or wheeze


[id number] without MRG


NABS nontender no HSM


no CCE


nonfocal











Dawit Reilly MD Nov 19, 2019 08:26

## 2019-11-19 NOTE — NUR
*-* INSURANCE *-*



ALL CLINICALS AND REVIEWS SENT TO:



[facility]

ADJ: RIVER TORRES TO ADVISE IF THIS

WILL BE ASSIGNED TO A [facility]

P- [phone]

- [phone]...REVIEW/CLINICAL

-------------------------------------------------------------------------------

Addendum: 11/19/19 at 1300 by HOWIE TO CM

-------------------------------------------------------------------------------

&



 NCM: [employer]

P- 

- [phone]

## 2019-11-20 NOTE — NUR
*-* INSURANCE *-*



ALL CLINICALS AND REVIEWS SENT TO:



[facility]

ADJ: RIVER TORRES TO ADVISE IF THIS

WILL BE ASSIGNED TO A [facility]

P- 

- [phone]...REVIEW/CLINICAL



&



NCM: [employer]

P- 

- [phone]

-------------------------------------------------------------------------------

Addendum: 11/20/19 at 1429 by HOWIE TO CM

-------------------------------------------------------------------------------

DISCHARGE SUMMARY FAXED

## 2019-11-20 NOTE — DISCHARGE SUMMARY
Discharge Summary


Hospital Course


Date of Admission


Nov 15, 2019 at 09:30


Date of Discharge


Nov 19, 2019 at 18:35


Admitting Diagnosis





 Retained hardware and fusion at C5-C6, herniation at adjacent level at C4-C5 

and C6-C7.


Reason for Hospitalization:  elective surgery


HPI


Kathryn York is a 49 year old female who was admitted on Nov 15, 2019 at 

09:30 for Herniated Nucleus Pulposus,Pain,  Radiculopathy.  


Patient was admitted for elective surgery.


Consultations


Dr Hollis


Procedures








s.p 11/15/19 by Dr Early 


1. Removal of prior anterior fusion plate at C5-C6.


2. Assessment of intraoperative fusion at C5-C6.


3. Anterior cervical discectomy and fusion of C4-C5 using NuVasive


Interlock-C size 6 PEEK cage and three screws of 13 mm length with 1 mL of


Osteocel allograft bone and local autograft.


4. Anterior cervical discectomy and fusion of C6-C7 using NuVasive


Interlock-C size 6 PEEK cage and three screws of 13 mm length with 1 mL of


Osteocel allograft bone and local autograft.


5. Use of intraoperative microscope.


6. Motor-evoked potential monitoring.


7. Somatosensory-evoked potential monitoring.


8. Supervision and interpretation of fluoroscopy.


Hospital Course


status post surgery 


initially IV fluids


s/p perioperative antibiotics


neurovascular status  closely monitored,  remained stable 


incision clean,  dry , and intact  


patient developed severe headaches; patient with history of migraines headaches 





-MRI C spine revealed anterior fusion hardware at C4-5 and C6-7.  Prevertebral 

soft tissue  edema fluid ,  likely related to recent surgery.


Borderline mild spinal canal stenoses.





-MRI of the brain revealed  no acute findings .


 


-CT scan of C spine revealed  postsurgical changes of the cervical spine.


No unusual . features. Mild degenerative changes.





blood pressure was elevated


patient was unable to participate in PT sessions


pain management was addressed  


patient received Imitrex and started on Diamox


pain was eventually controlled , blood pressure stabilized


patient remained hemodynamically stable 





as headache resolved, patient was able to  start  working with physical 

therapist 


ambulated with PT  


fall precautions maintained;  safe for ambulation 


DVT prophylaxis provided 


use of incentive spirometry was  encouraged while in the bed 


tolerated diet , IV fluids discontinued  


Cepacol and Chloraseptic spray were on board as needed for comfort 


GI prophylaxis provided 


antiemetics were on board as needed 


voided freely


bowel regimen instituted 


patient was stable for discharge 


discharge instructions provided 


follow up with surgeon in the office as advised 





FINAL DIAGNOSES


1. Retained hardware and fusion at C5-C6, herniation at adjacent level at C4-C5 

and C6-C7.


2. s/p Hardware removal of cervicalC5-6, Anterior cervical discectomy and 

fusion of Cervical C4-5 and Cervical C6-7


3. Postoperative headaches /history of migraine headaches





Discharge Medications


Changed Medications:  


Hydrocodone Bit/Acetaminophen * (Norco *) 1 Each Tablet


1 TAB ORAL Q8HR PRN for For Pain, #10 TAB 0 Refills (Changed from: Q4H)


PRN PAIN


 


Continued Medications:  


Bupropion Xl* (Bupropion Xl*) 150 Mg Tab.er.24h


300 MG ORAL Q24H, TAB 0 Refills (This prescription has been renewed)





Carisoprodol* (Carisoprodol*) 350 Mg Tablet


350 MG ORAL BID, #90 TAB (This prescription has been renewed)





Escitalopram Oxalate* (Lexapro*) 10 Mg Tablet


30 MG ORAL DAILY, TAB (This prescription has been renewed)





Lamotrigine (Lamictal) 200 Mg Tablet


400 MG ORAL DAILY, #30 TAB 0 Refills (This prescription has been renewed)





Zolpidem Tartrate* (Zolpidem Tartrate*) 10 Mg Tablet


10 MG ORAL BEDTIME PRN for Insomnia, TAB 0 Refills (This prescription has been 

renewed)





 


Discontinued Medications:  


Clonazepam* (Klonopin*) 0.5 Mg Tablet


0.5 MG ORAL DAILY PRN for For Anxiety, #15 TAB 0 Refills





Hydrocodone Bit/Acetaminophen * (Norco *) 1 Each Tablet


1 TAB ORAL Q4H PRN for For Pain, #10 TAB 0 Refills


PRN PAIN








Discharge


Condition Upon Discharge:  stable


Discharge Disposition


Patient was discharged home





Discharge Instructions


Discharge Instructions


Special Instructions


I have been assigned to complete a D/C Summary on this account. I was not 

involved in the patient management











Margie Royal NP Nov 20, 2019 10:37

## 2024-07-11 NOTE — 48 HOUR POST ANESTHESIA EVAL
Post Anesthesia Evaluation


Procedure:  ACDF C4-5, C6-7


Date of Evaluation:  Nov 17, 2019


Time of Evaluation:  12:43


Nausea:  No


Vomiting:  No











Jovita Schwab MD Nov 17, 2019 12:43 normal...